# Patient Record
Sex: MALE | Race: WHITE | NOT HISPANIC OR LATINO | URBAN - METROPOLITAN AREA
[De-identification: names, ages, dates, MRNs, and addresses within clinical notes are randomized per-mention and may not be internally consistent; named-entity substitution may affect disease eponyms.]

---

## 2017-02-20 ENCOUNTER — INPATIENT (INPATIENT)
Facility: HOSPITAL | Age: 52
LOS: 2 days | Discharge: HOME | DRG: 603 | End: 2017-02-23
Attending: INTERNAL MEDICINE | Admitting: INTERNAL MEDICINE
Payer: COMMERCIAL

## 2017-02-20 VITALS
OXYGEN SATURATION: 100 % | WEIGHT: 235.01 LBS | SYSTOLIC BLOOD PRESSURE: 153 MMHG | TEMPERATURE: 98 F | HEART RATE: 84 BPM | RESPIRATION RATE: 18 BRPM | DIASTOLIC BLOOD PRESSURE: 95 MMHG | HEIGHT: 70 IN

## 2017-02-20 DIAGNOSIS — Z21 ASYMPTOMATIC HUMAN IMMUNODEFICIENCY VIRUS [HIV] INFECTION STATUS: ICD-10-CM

## 2017-02-20 DIAGNOSIS — Z41.8 ENCOUNTER FOR OTHER PROCEDURES FOR PURPOSES OTHER THAN REMEDYING HEALTH STATE: ICD-10-CM

## 2017-02-20 DIAGNOSIS — L03.213 PERIORBITAL CELLULITIS: ICD-10-CM

## 2017-02-20 DIAGNOSIS — I10 ESSENTIAL (PRIMARY) HYPERTENSION: ICD-10-CM

## 2017-02-20 DIAGNOSIS — J45.909 UNSPECIFIED ASTHMA, UNCOMPLICATED: ICD-10-CM

## 2017-02-20 LAB
ALBUMIN SERPL ELPH-MCNC: 3.9 G/DL — SIGNIFICANT CHANGE UP (ref 3.5–5)
ALP SERPL-CCNC: 86 U/L — SIGNIFICANT CHANGE UP (ref 40–120)
ALT FLD-CCNC: 54 U/L DA — SIGNIFICANT CHANGE UP (ref 10–60)
ANION GAP SERPL CALC-SCNC: 9 MMOL/L — SIGNIFICANT CHANGE UP (ref 5–17)
AST SERPL-CCNC: 16 U/L — SIGNIFICANT CHANGE UP (ref 10–40)
BASOPHILS # BLD AUTO: 0.1 K/UL — SIGNIFICANT CHANGE UP (ref 0–0.2)
BASOPHILS NFR BLD AUTO: 0.8 % — SIGNIFICANT CHANGE UP (ref 0–2)
BILIRUB SERPL-MCNC: 0.4 MG/DL — SIGNIFICANT CHANGE UP (ref 0.2–1.2)
BUN SERPL-MCNC: 14 MG/DL — SIGNIFICANT CHANGE UP (ref 7–18)
CALCIUM SERPL-MCNC: 8.9 MG/DL — SIGNIFICANT CHANGE UP (ref 8.4–10.5)
CHLORIDE SERPL-SCNC: 107 MMOL/L — SIGNIFICANT CHANGE UP (ref 96–108)
CO2 SERPL-SCNC: 25 MMOL/L — SIGNIFICANT CHANGE UP (ref 22–31)
CREAT SERPL-MCNC: 0.73 MG/DL — SIGNIFICANT CHANGE UP (ref 0.5–1.3)
EOSINOPHIL # BLD AUTO: 0.2 K/UL — SIGNIFICANT CHANGE UP (ref 0–0.5)
EOSINOPHIL NFR BLD AUTO: 2.7 % — SIGNIFICANT CHANGE UP (ref 0–6)
GLUCOSE SERPL-MCNC: 90 MG/DL — SIGNIFICANT CHANGE UP (ref 70–99)
HCT VFR BLD CALC: 45.6 % — SIGNIFICANT CHANGE UP (ref 39–50)
HGB BLD-MCNC: 15.4 G/DL — SIGNIFICANT CHANGE UP (ref 13–17)
LACTATE SERPL-SCNC: 1.4 MMOL/L — SIGNIFICANT CHANGE UP (ref 0.7–2)
LYMPHOCYTES # BLD AUTO: 1.7 K/UL — SIGNIFICANT CHANGE UP (ref 1–3.3)
LYMPHOCYTES # BLD AUTO: 23 % — SIGNIFICANT CHANGE UP (ref 13–44)
MCHC RBC-ENTMCNC: 31.2 PG — SIGNIFICANT CHANGE UP (ref 27–34)
MCHC RBC-ENTMCNC: 33.9 GM/DL — SIGNIFICANT CHANGE UP (ref 32–36)
MCV RBC AUTO: 91.9 FL — SIGNIFICANT CHANGE UP (ref 80–100)
MONOCYTES # BLD AUTO: 0.7 K/UL — SIGNIFICANT CHANGE UP (ref 0–0.9)
MONOCYTES NFR BLD AUTO: 9 % — SIGNIFICANT CHANGE UP (ref 2–14)
NEUTROPHILS # BLD AUTO: 4.8 K/UL — SIGNIFICANT CHANGE UP (ref 1.8–7.4)
NEUTROPHILS NFR BLD AUTO: 64.6 % — SIGNIFICANT CHANGE UP (ref 43–77)
PLATELET # BLD AUTO: 238 K/UL — SIGNIFICANT CHANGE UP (ref 150–400)
POTASSIUM SERPL-MCNC: 4 MMOL/L — SIGNIFICANT CHANGE UP (ref 3.5–5.3)
POTASSIUM SERPL-SCNC: 4 MMOL/L — SIGNIFICANT CHANGE UP (ref 3.5–5.3)
PROT SERPL-MCNC: 7.8 G/DL — SIGNIFICANT CHANGE UP (ref 6–8.3)
RBC # BLD: 4.96 M/UL — SIGNIFICANT CHANGE UP (ref 4.2–5.8)
RBC # FLD: 11.9 % — SIGNIFICANT CHANGE UP (ref 10.3–14.5)
SODIUM SERPL-SCNC: 141 MMOL/L — SIGNIFICANT CHANGE UP (ref 135–145)
WBC # BLD: 7.5 K/UL — SIGNIFICANT CHANGE UP (ref 3.8–10.5)
WBC # FLD AUTO: 7.5 K/UL — SIGNIFICANT CHANGE UP (ref 3.8–10.5)

## 2017-02-20 PROCEDURE — 99285 EMERGENCY DEPT VISIT HI MDM: CPT

## 2017-02-20 PROCEDURE — 70481 CT ORBIT/EAR/FOSSA W/DYE: CPT | Mod: 26

## 2017-02-20 RX ORDER — EFAVIRENZ, EMTRICITABINE AND TENOFOVIR DISOPROXIL FUMARATE 600; 200; 300 MG/1; MG/1; MG/1
1 TABLET, FILM COATED ORAL DAILY
Qty: 0 | Refills: 0 | Status: DISCONTINUED | OUTPATIENT
Start: 2017-02-20 | End: 2017-02-23

## 2017-02-20 RX ORDER — ENOXAPARIN SODIUM 100 MG/ML
40 INJECTION SUBCUTANEOUS DAILY
Qty: 0 | Refills: 0 | Status: DISCONTINUED | OUTPATIENT
Start: 2017-02-20 | End: 2017-02-23

## 2017-02-20 RX ORDER — NEOMYCIN SULFATE, POLYMYXIN B SULFATE AND HYDROCORTISONE 3.5; 10000; 1 MG/ML; [USP'U]/ML; MG/ML
2 SUSPENSION OPHTHALMIC THREE TIMES A DAY
Qty: 0 | Refills: 0 | Status: DISCONTINUED | OUTPATIENT
Start: 2017-02-20 | End: 2017-02-20

## 2017-02-20 RX ORDER — VANCOMYCIN HCL 1 G
1000 VIAL (EA) INTRAVENOUS ONCE
Qty: 0 | Refills: 0 | Status: COMPLETED | OUTPATIENT
Start: 2017-02-20 | End: 2017-02-20

## 2017-02-20 RX ORDER — PIPERACILLIN AND TAZOBACTAM 4; .5 G/20ML; G/20ML
3.38 INJECTION, POWDER, LYOPHILIZED, FOR SOLUTION INTRAVENOUS EVERY 8 HOURS
Qty: 0 | Refills: 0 | Status: DISCONTINUED | OUTPATIENT
Start: 2017-02-20 | End: 2017-02-21

## 2017-02-20 RX ORDER — NEOMYCIN/POLYMYXIN B/DEXAMETHA 0.1 %
2 SUSPENSION, DROPS(FINAL DOSAGE FORM)(ML) OPHTHALMIC (EYE) THREE TIMES A DAY
Qty: 0 | Refills: 0 | Status: COMPLETED | OUTPATIENT
Start: 2017-02-20 | End: 2017-02-23

## 2017-02-20 RX ORDER — VANCOMYCIN HCL 1 G
1000 VIAL (EA) INTRAVENOUS EVERY 12 HOURS
Qty: 0 | Refills: 0 | Status: DISCONTINUED | OUTPATIENT
Start: 2017-02-20 | End: 2017-02-23

## 2017-02-20 RX ORDER — LISINOPRIL 2.5 MG/1
40 TABLET ORAL DAILY
Qty: 0 | Refills: 0 | Status: DISCONTINUED | OUTPATIENT
Start: 2017-02-20 | End: 2017-02-23

## 2017-02-20 RX ORDER — PIPERACILLIN AND TAZOBACTAM 4; .5 G/20ML; G/20ML
3.38 INJECTION, POWDER, LYOPHILIZED, FOR SOLUTION INTRAVENOUS ONCE
Qty: 0 | Refills: 0 | Status: COMPLETED | OUTPATIENT
Start: 2017-02-20 | End: 2017-02-20

## 2017-02-20 RX ADMIN — Medication 250 MILLIGRAM(S): at 14:06

## 2017-02-20 RX ADMIN — PIPERACILLIN AND TAZOBACTAM 25 GRAM(S): 4; .5 INJECTION, POWDER, LYOPHILIZED, FOR SOLUTION INTRAVENOUS at 21:09

## 2017-02-20 RX ADMIN — EFAVIRENZ, EMTRICITABINE AND TENOFOVIR DISOPROXIL FUMARATE 1 TABLET(S): 600; 200; 300 TABLET, FILM COATED ORAL at 20:05

## 2017-02-20 RX ADMIN — Medication 2 DROP(S): at 22:41

## 2017-02-20 RX ADMIN — PIPERACILLIN AND TAZOBACTAM 200 GRAM(S): 4; .5 INJECTION, POWDER, LYOPHILIZED, FOR SOLUTION INTRAVENOUS at 14:05

## 2017-02-20 NOTE — H&P ADULT. - NEGATIVE GENERAL SYMPTOMS
no fatigue/no fever/no malaise/no weight gain/no polyuria/no anorexia/no polyphagia/no chills/no sweating/no polydipsia/no weight loss

## 2017-02-20 NOTE — H&P ADULT. - PROBLEM SELECTOR PLAN 2
HIV( 13 yrs), on atripla ( last CD4 count about 8 months ago, normal, viral load < 20 copies, )   Continue with same meds, f/up on Viral load, T cell subset in AM

## 2017-02-20 NOTE — ED ADULT NURSE NOTE - OBJECTIVE STATEMENT
Presented to the ED complaining of left eye swelling, redness and pain that started last friday per patient. Eye got swollen today. AA&Ox3. Breathing on room air. No discharge observed at this time.

## 2017-02-20 NOTE — H&P ADULT. - NEGATIVE OPHTHALMOLOGIC SYMPTOMS
no discharge R/no photophobia/no lacrimation R/no diplopia/no lacrimation L/no discharge L/no blurred vision L/no blurred vision R

## 2017-02-20 NOTE — ED PROVIDER NOTE - OBJECTIVE STATEMENT
52 y/o M w/ PMHx of HIV and HTN p/w L eye pain and swelling onset yesterday w/ pain on movement. Pt went to urgent care yesterday and was given Azithromycin and BID Augmentin. Pt reports sty to R eye 2 weeks ago which resolved on its own. Pt denies foreign body, cough, abd pain, visual changes, or any other complaint. NKDA. CD4 count last checked 6 months ago, 1300. 50 y/o M w/ PMHx of HIV and HTN p/w L eye pain and swelling onset yesterday w/ pain on movement. Pt went to urgent care yesterday and was given Azithromycin and BID Augmentin. Pt reports sty to R eye 2 weeks ago which resolved on its own. Pt denies injury/trauma, foreign body, cough, abd pain, visual changes, or any other complaint. NKDA. CD4 count last checked 6 months ago, 1300. 52 y/o M w/ PMHx of HIV and HTN p/w L eye pain and swelling onset yesterday w/ pain on movement. Pt went to urgent care yesterday and was given Azithromycin and BID Augmentin. Pt reports sty to R eye 2 weeks ago which resolved on its own. Pain increased w/ EOM.  Pt denies injury/trauma (despite RN note), foreign body, cough, abd pain, visual changes, or any other complaint. NKDA. CD4 count last checked 6 months ago, 1300.

## 2017-02-20 NOTE — H&P ADULT. - ASSESSMENT
52 Y/O M, from home, PMHx of HIV ( 13 yrs ago), Hypertension, asthma, presented with worsening swelling of left eye. Admitted likely for questionable orbital cellulitis, IV antibiotics.     In ED , vital signs stable , /95, afebrile, RR 18, Spo2 98. On physical exam the left periorbital area is edematous, erythematous, chemosis , non-tender, pain on eye movements. On labs , normal, no leukocytosis. EKG NSR at 88 bpm. CT scan of orbit shows  skin thickening and swelling and subcutaneous edema in the  tissues overlying the left orbit. There is no subcutaneous air,  no focal fluid collection. The orbit itself and retrobulbar regions are  unremarkable, no abnormal lymphadenopathy. There is mild thickening of the bilateral maxillary sinuses. The other  visualized sinuses and air spaces are unremarkable. The intracranial contents are unremarkable. The osseous structures are unremarkable.

## 2017-02-20 NOTE — ED PROVIDER NOTE - PROGRESS NOTE DETAILS
The scribe's documentation has been prepared under my direction and personally reviewed by me in its entirety. I confirm that the note above accurately reflects all work, treatment, procedures, and medical decicion-making performed by me.  Po Hicks MD Pt just moved from [place], no PMD/ID in area.  Dr Merritt d/w Dr. Lovell and MAR.

## 2017-02-20 NOTE — H&P ADULT. - MUSCULOSKELETAL
details… detailed exam no joint erythema/no joint warmth/no calf tenderness/normal/ROM intact/normal strength/no joint swelling

## 2017-02-20 NOTE — H&P ADULT. - NEGATIVE GASTROINTESTINAL SYMPTOMS
no vomiting/no steatorrhea/no melena/no constipation/no hematochezia/no nausea/no change in bowel habits/no abdominal pain/no hiccoughs/no flatulence/no diarrhea/no jaundice

## 2017-02-20 NOTE — H&P ADULT. - NEUROLOGICAL DETAILS
no spontaneous movement/disoriented/responds to verbal commands/cranial nerves intact/alert and oriented x 3/responds to pain/deep reflexes intact/sensation intact/superficial reflexes intact

## 2017-02-20 NOTE — ED PROVIDER NOTE - ATTENDING CONTRIBUTION TO CARE
DR. VANESSA, ATTENDING MD-  I performed a face to face bedside interview with patient regarding history of present illness, review of symptoms and past medical history. I completed an independent physical exam.  I have discussed patient's plan of care with the resident.   Documentation as above in the note. DR. VANESSA, ATTENDING MD-  I performed a face to face bedside interview with patient regarding history of present illness, review of symptoms and past medical history. I completed an independent physical exam.  I have discussed patient's plan of care with the resident.   Documentation as above in the note.  HPI: HIV+ M with normal CD4 8 months ago just moved back to [facility] from [place] that presents with left eye swelling x 2 days. Was seen at  for stye on Right and Left eye, Rx'd augmentin and Erythromycin eye drops but woke up today with severe left eye swelling, no vision changes, no pain with eye mvmt, no fevers, chills, nausea, vomiting.   EXAM: Left periorbital swelling, +erythema, warmth. No discharge, no open wounds, no bleeding, induration around left eye. Eye: EOMI, PERRL. No proptosis, right eye normal with stye on upper margin. otherwise well appearing.   MDM: Most likely preorbital cellulitis. Low likelihood posterior cellulitis. Will obtain labs and CT and needs admission as failed Outpt Abx.   Labs WNL, including LA and WBC. CT shows periorbital cellulitis. No posterior or other infectious etiology behind eye. Will provide one dose Vancomycin and admit.

## 2017-02-20 NOTE — H&P ADULT. - GASTROINTESTINAL DETAILS
no rebound tenderness/normal/no masses palpable/no rigidity/bowel sounds normal/no bruit/soft/no guarding/no organomegaly/no distention/nontender

## 2017-02-20 NOTE — H&P ADULT. - PROBLEM SELECTOR PLAN 1
Worsening swelling, pain, redness of left eye,   On physical exam pain on eye movements. Vitals stable   On labs no WBC count  CT scan of orbit shows  skin thickening and swelling and subcutaneous edema in the   tissues overlying the left orbit. There is no subcutaneous air,  no focal fluid collection. The orbit itself and retrobulbar regions are  unremarkable, no abnormal lymphadenopathy. There is mild thickening of the bilateral maxillary sinuses. The other  visualized sinuses and air spaces are unremarkable. The intracranial contents are unremarkable. The osseous structures are unremarkable.  Got vancomycin and zosyn in ED  ID Dr Khan consulted  Continue with same antibiotic, corticosporin ointment, warm compressors .  F/u on Vanco trough

## 2017-02-20 NOTE — H&P ADULT. - PROBLEM SELECTOR PLAN 4
Controlled, patient takes montelukast and bronchodilators. Not in exacerbation at this time, no need to give bronchodilator.

## 2017-02-20 NOTE — H&P ADULT. - HISTORY OF PRESENT ILLNESS
50 Y/O M, from home, PMHx of HIV ( 13 yrs ago), Hypertension, asthma, presented with worsening swelling of left eye. According to patient about 2 weeks ago he had small stye on upper right eyelid, he took OTC medication, ointment, warm compressors. Swelling resolved later on. About 3 days ago he started having severe left eye pain, small swelling alonwith redness on lower eyelid. He went to urgent care, prescribed augmenti and erythromycin ointment. He woke up this morning with sever excruciating pain, inability to open the left eye. He then came to ED. Left eye selling associated with pain on eye movements redness, chemosis, slight yellow colour dishcrage. Deneis any trauma , insect bite to left eye, any fever, chills, recent flu like symptoms, discharge from eye. He has HIV since last 13 yrs , take atripla ( compliant , 5-7 yrs) , being followed by ID. AS per him last CD4 count was about 8 months ago that was normal , viral load  <20 copies, T cell subset around 1687-0167. For hsi HTN he take lisinopril. Also history of asthma, controlled. Patient moved from [place] to [place] about a year ago, does not have infectious disease specialist here. Last visit to PCP was about 2 months ago for routine check up.     FHx: Parents are diseased, have diabetes  Shx: Former smoker of about half pack / day x 30 yrs, stopped in last year april. Social alcohol drinking. No IV drug use. Works in [employer] company. Not . Recent travel to [place] about on last month.   Allergies: NKDA 50 Y/O M, from home, PMHx of HIV ( 13 yrs ago), Hypertension, asthma, presented with worsening swelling of left eye. According to patient about 2 weeks ago he had small stye on upper right eyelid, he took OTC medication, ointment, warm compressors. Swelling resolved later on. About 3 days ago he started having severe left eye pain, small swelling alonwith redness on lower eyelid. He went to urgent care, prescribed augmentin and erythromycin ointment. He woke up this morning with sever excruciating pain, inability to open the left eye. He then came to ED. Left eye selling associated with pain on eye movements redness, chemosis, slight yellow colour dishcrage. Deneis any trauma , insect bite to left eye, any fever, chills, recent flu like symptoms, discharge from eye. He has HIV since last 13 yrs , take atripla ( compliant , 5-7 yrs) , being followed by ID. AS per him last CD4 count was about 8 months ago that was normal , viral load  <20 copies, T cell subset around 2197-6241. For hsi HTN he take lisinopril. Also history of asthma, controlled. Patient moved from [place] to [place] about a year ago, does not have infectious disease specialist here. Last visit to PCP was about 2 months ago for routine check up.     FHx: Parents are diseased, have diabetes  Shx: Former smoker of about half pack / day x 30 yrs, stopped in last year april. Social alcohol drinking. No IV drug use. Works in [employer] company. Not . Recent travel to [place] about on last month.   Allergies: NKDA

## 2017-02-20 NOTE — H&P ADULT. - PROBLEM SELECTOR PLAN 5
Improved VTE score is 0, with 3 month risk of VTE is < 0.4. Continue with lovenox for now . No need for GI prophylaxis.

## 2017-02-20 NOTE — H&P ADULT. - RS GEN PE MLT RESP DETAILS PC
respirations non-labored/clear to auscultation bilaterally/no rales/normal/no wheezes/breath sounds equal/no subcutaneous emphysema/no chest wall tenderness/no intercostal retractions/no rhonchi/airway patent/good air movement

## 2017-02-20 NOTE — H&P ADULT. - NEGATIVE CARDIOVASCULAR SYMPTOMS
no chest pain/no palpitations/no dyspnea on exertion/no claudication/no peripheral edema/no paroxysmal nocturnal dyspnea/no orthopnea

## 2017-02-20 NOTE — ED PROVIDER NOTE - NS ED MD SCRIBE ATTENDING SCRIBE SECTIONS
HISTORY OF PRESENT ILLNESS/PAST MEDICAL/SURGICAL/SOCIAL HISTORY/PHYSICAL EXAM/VITAL SIGNS( Pullset)/HIV/REVIEW OF SYSTEMS/DISPOSITION

## 2017-02-20 NOTE — H&P ADULT. - NEGATIVE ENMT SYMPTOMS
no throat pain/no nasal obstruction/no gum bleeding/no dry mouth/no dysphagia/no tinnitus/no abnormal taste sensation/no vertigo/no post-nasal discharge/no sinus symptoms/no nasal discharge/no recurrent cold sores/no hearing difficulty/no ear pain/no nasal congestion/no nose bleeds

## 2017-02-20 NOTE — H&P ADULT. - NEGATIVE MUSCULOSKELETAL SYMPTOMS
no arm pain R/no muscle cramps/no muscle weakness/no back pain/no leg pain L/no joint swelling/no neck pain/no leg pain R/no arthralgia/no myalgia/no arthritis/no stiffness/no arm pain L

## 2017-02-21 LAB
ALBUMIN SERPL ELPH-MCNC: 3.5 G/DL — SIGNIFICANT CHANGE UP (ref 3.5–5)
ALP SERPL-CCNC: 77 U/L — SIGNIFICANT CHANGE UP (ref 40–120)
ALT FLD-CCNC: 49 U/L DA — SIGNIFICANT CHANGE UP (ref 10–60)
ANION GAP SERPL CALC-SCNC: 9 MMOL/L — SIGNIFICANT CHANGE UP (ref 5–17)
AST SERPL-CCNC: 14 U/L — SIGNIFICANT CHANGE UP (ref 10–40)
BASOPHILS # BLD AUTO: 0 K/UL — SIGNIFICANT CHANGE UP (ref 0–0.2)
BASOPHILS NFR BLD AUTO: 0.7 % — SIGNIFICANT CHANGE UP (ref 0–2)
BILIRUB SERPL-MCNC: 0.4 MG/DL — SIGNIFICANT CHANGE UP (ref 0.2–1.2)
BUN SERPL-MCNC: 15 MG/DL — SIGNIFICANT CHANGE UP (ref 7–18)
CALCIUM SERPL-MCNC: 8.6 MG/DL — SIGNIFICANT CHANGE UP (ref 8.4–10.5)
CHLORIDE SERPL-SCNC: 106 MMOL/L — SIGNIFICANT CHANGE UP (ref 96–108)
CHOLEST SERPL-MCNC: 160 MG/DL — SIGNIFICANT CHANGE UP (ref 10–199)
CO2 SERPL-SCNC: 27 MMOL/L — SIGNIFICANT CHANGE UP (ref 22–31)
CREAT SERPL-MCNC: 0.89 MG/DL — SIGNIFICANT CHANGE UP (ref 0.5–1.3)
EOSINOPHIL # BLD AUTO: 0.2 K/UL — SIGNIFICANT CHANGE UP (ref 0–0.5)
EOSINOPHIL NFR BLD AUTO: 4.8 % — SIGNIFICANT CHANGE UP (ref 0–6)
FOLATE SERPL-MCNC: 15.3 NG/ML — SIGNIFICANT CHANGE UP (ref 4.8–24.2)
GLUCOSE SERPL-MCNC: 140 MG/DL — HIGH (ref 70–99)
GRAM STN FLD: SIGNIFICANT CHANGE UP
HBA1C BLD-MCNC: 5.5 % — SIGNIFICANT CHANGE UP (ref 4–5.6)
HCT VFR BLD CALC: 43.9 % — SIGNIFICANT CHANGE UP (ref 39–50)
HDLC SERPL-MCNC: 29 MG/DL — LOW (ref 40–125)
HGB BLD-MCNC: 14.8 G/DL — SIGNIFICANT CHANGE UP (ref 13–17)
LIPID PNL WITH DIRECT LDL SERPL: 106 MG/DL — SIGNIFICANT CHANGE UP
LYMPHOCYTES # BLD AUTO: 1.2 K/UL — SIGNIFICANT CHANGE UP (ref 1–3.3)
LYMPHOCYTES # BLD AUTO: 22.5 % — SIGNIFICANT CHANGE UP (ref 13–44)
MAGNESIUM SERPL-MCNC: 2.3 MG/DL — SIGNIFICANT CHANGE UP (ref 1.8–2.4)
MCHC RBC-ENTMCNC: 31.4 PG — SIGNIFICANT CHANGE UP (ref 27–34)
MCHC RBC-ENTMCNC: 33.7 GM/DL — SIGNIFICANT CHANGE UP (ref 32–36)
MCV RBC AUTO: 93.3 FL — SIGNIFICANT CHANGE UP (ref 80–100)
MONOCYTES # BLD AUTO: 0.5 K/UL — SIGNIFICANT CHANGE UP (ref 0–0.9)
MONOCYTES NFR BLD AUTO: 8.9 % — SIGNIFICANT CHANGE UP (ref 2–14)
NEUTROPHILS # BLD AUTO: 3.3 K/UL — SIGNIFICANT CHANGE UP (ref 1.8–7.4)
NEUTROPHILS NFR BLD AUTO: 63 % — SIGNIFICANT CHANGE UP (ref 43–77)
PHOSPHATE SERPL-MCNC: 3.4 MG/DL — SIGNIFICANT CHANGE UP (ref 2.5–4.5)
PLATELET # BLD AUTO: 227 K/UL — SIGNIFICANT CHANGE UP (ref 150–400)
POTASSIUM SERPL-MCNC: 4.4 MMOL/L — SIGNIFICANT CHANGE UP (ref 3.5–5.3)
POTASSIUM SERPL-SCNC: 4.4 MMOL/L — SIGNIFICANT CHANGE UP (ref 3.5–5.3)
PROT SERPL-MCNC: 6.9 G/DL — SIGNIFICANT CHANGE UP (ref 6–8.3)
RBC # BLD: 4.71 M/UL — SIGNIFICANT CHANGE UP (ref 4.2–5.8)
RBC # FLD: 12 % — SIGNIFICANT CHANGE UP (ref 10.3–14.5)
SODIUM SERPL-SCNC: 142 MMOL/L — SIGNIFICANT CHANGE UP (ref 135–145)
TOTAL CHOLESTEROL/HDL RATIO MEASUREMENT: 5.5 RATIO — SIGNIFICANT CHANGE UP (ref 3.4–9.6)
TRIGL SERPL-MCNC: 125 MG/DL — SIGNIFICANT CHANGE UP (ref 10–149)
VIT B12 SERPL-MCNC: 839 PG/ML — SIGNIFICANT CHANGE UP (ref 243–894)
WBC # BLD: 5.2 K/UL — SIGNIFICANT CHANGE UP (ref 3.8–10.5)
WBC # FLD AUTO: 5.2 K/UL — SIGNIFICANT CHANGE UP (ref 3.8–10.5)

## 2017-02-21 PROCEDURE — 71020: CPT | Mod: 26

## 2017-02-21 RX ADMIN — Medication 250 MILLIGRAM(S): at 05:12

## 2017-02-21 RX ADMIN — PIPERACILLIN AND TAZOBACTAM 25 GRAM(S): 4; .5 INJECTION, POWDER, LYOPHILIZED, FOR SOLUTION INTRAVENOUS at 14:19

## 2017-02-21 RX ADMIN — LISINOPRIL 40 MILLIGRAM(S): 2.5 TABLET ORAL at 06:17

## 2017-02-21 RX ADMIN — PIPERACILLIN AND TAZOBACTAM 25 GRAM(S): 4; .5 INJECTION, POWDER, LYOPHILIZED, FOR SOLUTION INTRAVENOUS at 06:17

## 2017-02-21 RX ADMIN — ENOXAPARIN SODIUM 40 MILLIGRAM(S): 100 INJECTION SUBCUTANEOUS at 12:46

## 2017-02-21 RX ADMIN — Medication 2 DROP(S): at 06:17

## 2017-02-21 RX ADMIN — Medication 250 MILLIGRAM(S): at 17:01

## 2017-02-21 RX ADMIN — Medication 2 DROP(S): at 21:15

## 2017-02-21 RX ADMIN — Medication 2 DROP(S): at 14:20

## 2017-02-21 RX ADMIN — EFAVIRENZ, EMTRICITABINE AND TENOFOVIR DISOPROXIL FUMARATE 1 TABLET(S): 600; 200; 300 TABLET, FILM COATED ORAL at 21:15

## 2017-02-22 LAB
4/8 RATIO: 2.37 RATIO — SIGNIFICANT CHANGE UP (ref 0.9–3.6)
ABS CD8: 268 /UL — SIGNIFICANT CHANGE UP (ref 136–757)
ANION GAP SERPL CALC-SCNC: 7 MMOL/L — SIGNIFICANT CHANGE UP (ref 5–17)
BUN SERPL-MCNC: 15 MG/DL — SIGNIFICANT CHANGE UP (ref 7–18)
CALCIUM SERPL-MCNC: 8.3 MG/DL — LOW (ref 8.4–10.5)
CD3 BLASTS SPEC-ACNC: 82 % — SIGNIFICANT CHANGE UP (ref 59–85)
CD3 BLASTS SPEC-ACNC: 899 /UL — SIGNIFICANT CHANGE UP (ref 799–2171)
CD4 %: 58 % — SIGNIFICANT CHANGE UP (ref 36–65)
CD8 %: 24 % — SIGNIFICANT CHANGE UP (ref 11–36)
CHLORIDE SERPL-SCNC: 106 MMOL/L — SIGNIFICANT CHANGE UP (ref 96–108)
CO2 SERPL-SCNC: 26 MMOL/L — SIGNIFICANT CHANGE UP (ref 22–31)
CREAT SERPL-MCNC: 0.79 MG/DL — SIGNIFICANT CHANGE UP (ref 0.5–1.3)
GLUCOSE SERPL-MCNC: 131 MG/DL — HIGH (ref 70–99)
GRAM STN FLD: SIGNIFICANT CHANGE UP
HCT VFR BLD CALC: 43.5 % — SIGNIFICANT CHANGE UP (ref 39–50)
HGB BLD-MCNC: 14.6 G/DL — SIGNIFICANT CHANGE UP (ref 13–17)
HIV1 RNA # SERPL NAA+PROBE: SIGNIFICANT CHANGE UP
HIV1 RNA SERPL NAA+PROBE-LOG#: ABNORMAL LG10COP/ML
MAGNESIUM SERPL-MCNC: 2.2 MG/DL — SIGNIFICANT CHANGE UP (ref 1.8–2.4)
MCHC RBC-ENTMCNC: 31.1 PG — SIGNIFICANT CHANGE UP (ref 27–34)
MCHC RBC-ENTMCNC: 33.6 GM/DL — SIGNIFICANT CHANGE UP (ref 32–36)
MCV RBC AUTO: 92.5 FL — SIGNIFICANT CHANGE UP (ref 80–100)
PHOSPHATE SERPL-MCNC: 3.1 MG/DL — SIGNIFICANT CHANGE UP (ref 2.5–4.5)
PLATELET # BLD AUTO: 203 K/UL — SIGNIFICANT CHANGE UP (ref 150–400)
POTASSIUM SERPL-MCNC: 4.1 MMOL/L — SIGNIFICANT CHANGE UP (ref 3.5–5.3)
POTASSIUM SERPL-SCNC: 4.1 MMOL/L — SIGNIFICANT CHANGE UP (ref 3.5–5.3)
RBC # BLD: 4.7 M/UL — SIGNIFICANT CHANGE UP (ref 4.2–5.8)
RBC # FLD: 11.7 % — SIGNIFICANT CHANGE UP (ref 10.3–14.5)
SODIUM SERPL-SCNC: 139 MMOL/L — SIGNIFICANT CHANGE UP (ref 135–145)
T-CELL CD4 SUBSET PNL BLD: 635 /UL — SIGNIFICANT CHANGE UP (ref 489–1457)
VANCOMYCIN TROUGH SERPL-MCNC: 26.7 UG/ML — CRITICAL HIGH (ref 10–20)
VANCOMYCIN TROUGH SERPL-MCNC: 5.8 UG/ML — LOW (ref 10–20)
WBC # BLD: 5.4 K/UL — SIGNIFICANT CHANGE UP (ref 3.8–10.5)
WBC # FLD AUTO: 5.4 K/UL — SIGNIFICANT CHANGE UP (ref 3.8–10.5)

## 2017-02-22 RX ADMIN — Medication 2 DROP(S): at 14:08

## 2017-02-22 RX ADMIN — Medication 250 MILLIGRAM(S): at 05:25

## 2017-02-22 RX ADMIN — ENOXAPARIN SODIUM 40 MILLIGRAM(S): 100 INJECTION SUBCUTANEOUS at 12:52

## 2017-02-22 RX ADMIN — LISINOPRIL 40 MILLIGRAM(S): 2.5 TABLET ORAL at 05:25

## 2017-02-22 RX ADMIN — Medication 2 DROP(S): at 22:44

## 2017-02-22 RX ADMIN — EFAVIRENZ, EMTRICITABINE AND TENOFOVIR DISOPROXIL FUMARATE 1 TABLET(S): 600; 200; 300 TABLET, FILM COATED ORAL at 14:08

## 2017-02-22 RX ADMIN — Medication 2 DROP(S): at 05:25

## 2017-02-22 RX ADMIN — Medication 250 MILLIGRAM(S): at 18:59

## 2017-02-22 NOTE — PROVIDER CONTACT NOTE (CRITICAL VALUE NOTIFICATION) - TEST AND RESULT REPORTED:
Blood Cultures collected 2/20/2017 result are preliminary growth in aerobic and anaerobic bottles gram positive cocci in clusters

## 2017-02-23 VITALS
TEMPERATURE: 98 F | OXYGEN SATURATION: 98 % | SYSTOLIC BLOOD PRESSURE: 148 MMHG | DIASTOLIC BLOOD PRESSURE: 86 MMHG | RESPIRATION RATE: 18 BRPM | HEART RATE: 78 BPM

## 2017-02-23 LAB
ANION GAP SERPL CALC-SCNC: 10 MMOL/L — SIGNIFICANT CHANGE UP (ref 5–17)
BUN SERPL-MCNC: 12 MG/DL — SIGNIFICANT CHANGE UP (ref 7–18)
CALCIUM SERPL-MCNC: 8.5 MG/DL — SIGNIFICANT CHANGE UP (ref 8.4–10.5)
CHLORIDE SERPL-SCNC: 106 MMOL/L — SIGNIFICANT CHANGE UP (ref 96–108)
CO2 SERPL-SCNC: 25 MMOL/L — SIGNIFICANT CHANGE UP (ref 22–31)
CREAT SERPL-MCNC: 0.76 MG/DL — SIGNIFICANT CHANGE UP (ref 0.5–1.3)
CULTURE RESULTS: SIGNIFICANT CHANGE UP
GLUCOSE SERPL-MCNC: 103 MG/DL — HIGH (ref 70–99)
HCT VFR BLD CALC: 42.9 % — SIGNIFICANT CHANGE UP (ref 39–50)
HGB BLD-MCNC: 14.4 G/DL — SIGNIFICANT CHANGE UP (ref 13–17)
MAGNESIUM SERPL-MCNC: 2.2 MG/DL — SIGNIFICANT CHANGE UP (ref 1.8–2.4)
MCHC RBC-ENTMCNC: 31 PG — SIGNIFICANT CHANGE UP (ref 27–34)
MCHC RBC-ENTMCNC: 33.7 GM/DL — SIGNIFICANT CHANGE UP (ref 32–36)
MCV RBC AUTO: 92 FL — SIGNIFICANT CHANGE UP (ref 80–100)
PHOSPHATE SERPL-MCNC: 3.3 MG/DL — SIGNIFICANT CHANGE UP (ref 2.5–4.5)
PLATELET # BLD AUTO: 197 K/UL — SIGNIFICANT CHANGE UP (ref 150–400)
POTASSIUM SERPL-MCNC: 4.3 MMOL/L — SIGNIFICANT CHANGE UP (ref 3.5–5.3)
POTASSIUM SERPL-SCNC: 4.3 MMOL/L — SIGNIFICANT CHANGE UP (ref 3.5–5.3)
RBC # BLD: 4.66 M/UL — SIGNIFICANT CHANGE UP (ref 4.2–5.8)
RBC # FLD: 11.6 % — SIGNIFICANT CHANGE UP (ref 10.3–14.5)
SODIUM SERPL-SCNC: 141 MMOL/L — SIGNIFICANT CHANGE UP (ref 135–145)
SPECIMEN SOURCE: SIGNIFICANT CHANGE UP
WBC # BLD: 6.5 K/UL — SIGNIFICANT CHANGE UP (ref 3.8–10.5)
WBC # FLD AUTO: 6.5 K/UL — SIGNIFICANT CHANGE UP (ref 3.8–10.5)

## 2017-02-23 PROCEDURE — 87040 BLOOD CULTURE FOR BACTERIA: CPT

## 2017-02-23 PROCEDURE — 84100 ASSAY OF PHOSPHORUS: CPT

## 2017-02-23 PROCEDURE — 80053 COMPREHEN METABOLIC PANEL: CPT

## 2017-02-23 PROCEDURE — 96375 TX/PRO/DX INJ NEW DRUG ADDON: CPT

## 2017-02-23 PROCEDURE — 96374 THER/PROPH/DIAG INJ IV PUSH: CPT | Mod: 59

## 2017-02-23 PROCEDURE — 82607 VITAMIN B-12: CPT

## 2017-02-23 PROCEDURE — 71046 X-RAY EXAM CHEST 2 VIEWS: CPT

## 2017-02-23 PROCEDURE — 83605 ASSAY OF LACTIC ACID: CPT

## 2017-02-23 PROCEDURE — 83735 ASSAY OF MAGNESIUM: CPT

## 2017-02-23 PROCEDURE — 80202 ASSAY OF VANCOMYCIN: CPT

## 2017-02-23 PROCEDURE — 85027 COMPLETE CBC AUTOMATED: CPT

## 2017-02-23 PROCEDURE — 86360 T CELL ABSOLUTE COUNT/RATIO: CPT

## 2017-02-23 PROCEDURE — 80061 LIPID PANEL: CPT

## 2017-02-23 PROCEDURE — 70481 CT ORBIT/EAR/FOSSA W/DYE: CPT

## 2017-02-23 PROCEDURE — 99285 EMERGENCY DEPT VISIT HI MDM: CPT | Mod: 25

## 2017-02-23 PROCEDURE — 93005 ELECTROCARDIOGRAM TRACING: CPT

## 2017-02-23 PROCEDURE — 80048 BASIC METABOLIC PNL TOTAL CA: CPT

## 2017-02-23 PROCEDURE — 87536 HIV-1 QUANT&REVRSE TRNSCRPJ: CPT

## 2017-02-23 PROCEDURE — 82746 ASSAY OF FOLIC ACID SERUM: CPT

## 2017-02-23 PROCEDURE — 83036 HEMOGLOBIN GLYCOSYLATED A1C: CPT

## 2017-02-23 RX ORDER — NEOMYCIN/POLYMYXIN B/DEXAMETHA 0.1 %
1 SUSPENSION, DROPS(FINAL DOSAGE FORM)(ML) OPHTHALMIC (EYE) THREE TIMES A DAY
Qty: 0 | Refills: 0 | Status: DISCONTINUED | OUTPATIENT
Start: 2017-02-23 | End: 2017-02-23

## 2017-02-23 RX ORDER — NEOMYCIN/POLYMYXIN B/DEXAMETHA 0.1 %
1 SUSPENSION, DROPS(FINAL DOSAGE FORM)(ML) OPHTHALMIC (EYE)
Qty: 1 | Refills: 0 | OUTPATIENT
Start: 2017-02-23 | End: 2017-02-25

## 2017-02-23 RX ORDER — VANCOMYCIN HCL 1 G
1250 VIAL (EA) INTRAVENOUS EVERY 12 HOURS
Qty: 0 | Refills: 0 | Status: DISCONTINUED | OUTPATIENT
Start: 2017-02-23 | End: 2017-02-23

## 2017-02-23 RX ORDER — EFAVIRENZ, EMTRICITABINE AND TENOFOVIR DISOPROXIL FUMARATE 600; 200; 300 MG/1; MG/1; MG/1
1 TABLET, FILM COATED ORAL
Qty: 0 | Refills: 0 | COMMUNITY
Start: 2017-02-23

## 2017-02-23 RX ORDER — LISINOPRIL 2.5 MG/1
1 TABLET ORAL
Qty: 30 | Refills: 0 | OUTPATIENT
Start: 2017-02-23 | End: 2017-03-25

## 2017-02-23 RX ADMIN — LISINOPRIL 40 MILLIGRAM(S): 2.5 TABLET ORAL at 06:15

## 2017-02-23 RX ADMIN — ENOXAPARIN SODIUM 40 MILLIGRAM(S): 100 INJECTION SUBCUTANEOUS at 13:08

## 2017-02-23 RX ADMIN — Medication 2 DROP(S): at 06:16

## 2017-02-23 RX ADMIN — Medication 250 MILLIGRAM(S): at 06:16

## 2017-02-23 RX ADMIN — Medication 2 DROP(S): at 13:08

## 2017-02-23 RX ADMIN — EFAVIRENZ, EMTRICITABINE AND TENOFOVIR DISOPROXIL FUMARATE 1 TABLET(S): 600; 200; 300 TABLET, FILM COATED ORAL at 13:08

## 2017-02-23 NOTE — DISCHARGE NOTE ADULT - MEDICATION SUMMARY - MEDICATIONS TO TAKE
I will START or STAY ON the medications listed below when I get home from the hospital:    lisinopril 40 mg oral tablet  -- 1 tab(s) by mouth once a day  -- Indication: For HTN (hypertension)    doxycycline hyclate 100 mg oral tablet  -- 1 tab(s) by mouth 2 times a day  -- Avoid prolonged or excessive exposure to direct and/or artificial sunlight while taking this medication.  Do not take this drug if you are pregnant.  Finish all this medication unless otherwise directed by prescriber.  Medication should be taken with plenty of water.    -- Indication: For Periorbital cellulitis    efavirenz/emtricitabine/tenofovir 600 mg-200 mg-300 mg oral tablet  -- 1 tab(s) by mouth once a day  -- Indication: For HIV (human immunodeficiency virus infection)    dexamethasone/neomycin/polymyxin B 1 mg-3.5 mg-10,000 units/mL ophthalmic suspension  -- 1 drop(s) to each affected eye 3 times a day  -- Indication: For Conjunctivitis

## 2017-02-23 NOTE — DISCHARGE NOTE ADULT - HOSPITAL COURSE
51 year old male from, PMHx of HIV ( diagnosed 13 yrs ago), Hypertension, and asthma, presented with worsening swelling of left eye. The patient was admitted for periorbital cellulitis of the left eye as visualized on orbital CT. He was treated with IV antibiotic vancomycin and is to complete antibiotic course with __ more days of ___. He was also treated with maxitrol for bilateral conjunctivitis. Initial blood culture came back positive for coag negative staph, likely contaminant. Repeat blood cultures were negative. He is to continue atripla and follow up with outpatient ID for HIV management. He is to follow up with PMD in 3-5 days. He is now medically stable and ready for discharge. 51 year old male from, PMHx of HIV ( diagnosed 13 yrs ago), Hypertension, and asthma, presented with worsening swelling of left eye. The patient was admitted for periorbital cellulitis of the left eye as visualized on orbital CT. He was treated with IV antibiotic vancomycin and is to complete antibiotic course with 7 days of doxycycline. He was also treated with maxitrol for bilateral conjunctivitis. Initial blood culture came back positive for coag negative staph, likely contaminant. Repeat blood cultures were negative. He is to continue atripla and follow up with outpatient ID for HIV management. He is to follow up with PMD in 3-5 days. He is now medically stable and ready for discharge.

## 2017-02-23 NOTE — DISCHARGE NOTE ADULT - PATIENT PORTAL LINK FT
“You can access the [facility] Patient Portal, offered by [facility], by registering with the following website: [email or web address]”

## 2017-02-23 NOTE — DISCHARGE NOTE ADULT - PLAN OF CARE
resolution of periorbital cellulitis Patient to continue on oral antibiotics to complete antibiotic course. He is to follow up with PMD in 3-5 days. Patient to continue on atripla and follow up with outpatient ID. continue home medications Patient to continue on doxycycline 100mg orally twice a day for 7 days to complete antibiotic course. He is to follow up with PMD in 3-5 days. Patient to continue on eye drops x 2 more days for bilateral conjunctivitis.

## 2017-02-23 NOTE — DISCHARGE NOTE ADULT - CARE PLAN
Principal Discharge DX:	Periorbital cellulitis of left eye  Goal:	resolution of periorbital cellulitis  Instructions for follow-up, activity and diet:	Patient to continue on oral antibiotics to complete antibiotic course. He is to follow up with PMD in 3-5 days.  Secondary Diagnosis:	HIV (human immunodeficiency virus infection)  Instructions for follow-up, activity and diet:	Patient to continue on atripla and follow up with outpatient ID.  Secondary Diagnosis:	Essential hypertension  Instructions for follow-up, activity and diet:	continue home medications  Secondary Diagnosis:	Uncomplicated asthma, unspecified asthma severity  Instructions for follow-up, activity and diet:	continue home medications Principal Discharge DX:	Periorbital cellulitis of left eye  Goal:	resolution of periorbital cellulitis  Instructions for follow-up, activity and diet:	Patient to continue on doxycycline 100mg orally twice a day for 7 days to complete antibiotic course. He is to follow up with PMD in 3-5 days. Patient to continue on eye drops x 2 more days for bilateral conjunctivitis.  Secondary Diagnosis:	HIV (human immunodeficiency virus infection)  Instructions for follow-up, activity and diet:	Patient to continue on atripla and follow up with outpatient ID.  Secondary Diagnosis:	Essential hypertension  Instructions for follow-up, activity and diet:	continue home medications  Secondary Diagnosis:	Uncomplicated asthma, unspecified asthma severity  Instructions for follow-up, activity and diet:	continue home medications

## 2017-02-23 NOTE — DISCHARGE NOTE ADULT - SECONDARY DIAGNOSIS.
HIV (human immunodeficiency virus infection) Essential hypertension Uncomplicated asthma, unspecified asthma severity

## 2017-02-25 LAB
CULTURE RESULTS: SIGNIFICANT CHANGE UP
SPECIMEN SOURCE: SIGNIFICANT CHANGE UP

## 2017-02-27 LAB
CULTURE RESULTS: SIGNIFICANT CHANGE UP
CULTURE RESULTS: SIGNIFICANT CHANGE UP
SPECIMEN SOURCE: SIGNIFICANT CHANGE UP
SPECIMEN SOURCE: SIGNIFICANT CHANGE UP

## 2017-02-28 DIAGNOSIS — Z29.8 ENCOUNTER FOR OTHER SPECIFIED PROPHYLACTIC MEASURES: ICD-10-CM

## 2017-02-28 DIAGNOSIS — I10 ESSENTIAL (PRIMARY) HYPERTENSION: ICD-10-CM

## 2017-02-28 DIAGNOSIS — Z87.891 PERSONAL HISTORY OF NICOTINE DEPENDENCE: ICD-10-CM

## 2017-02-28 DIAGNOSIS — Z21 ASYMPTOMATIC HUMAN IMMUNODEFICIENCY VIRUS [HIV] INFECTION STATUS: ICD-10-CM

## 2017-02-28 DIAGNOSIS — J45.909 UNSPECIFIED ASTHMA, UNCOMPLICATED: ICD-10-CM

## 2017-02-28 DIAGNOSIS — L03.213 PERIORBITAL CELLULITIS: ICD-10-CM

## 2017-02-28 DIAGNOSIS — H10.9 UNSPECIFIED CONJUNCTIVITIS: ICD-10-CM

## 2020-02-11 NOTE — PATIENT PROFILE ADULT. - ABILITY TO HEAR (WITH HEARING AID OR HEARING APPLIANCE IF NORMALLY USED):
Adequate: hears normal conversation without difficulty What Is The Reason For Today's Visit?: Full Body Skin Examination with No Concerns What Is The Reason For Today's Visit? (Being Monitored For X): the re-examination of lesions previously examined

## 2022-10-26 NOTE — PATIENT PROFILE ADULT. - TEACHING/LEARNING FACTORS INFLUENCE READINESS TO LEARN
Patient comes to clinic for follow up anticoagulation visit.  Last INR on 10/12/22 was 1.5.  Dose maintained.   Today's INR is 1.6 and is below goal range.    Current warfarin total weekly dose of 13.5 mg verified.  Informed the INR result is below therapeutic range and instructed to increase current dose per protocol. Discussed dose and return date of 11/16/22 for next INR. See Anticoagulation flowsheet.    INR today is 1.6, remains below range and up from previous INR of 1.5, in two weeks on most stable dose of 15mg/wk. Pt admits missed warfarin on 10/22 after celebrating birthday and returning home late, forgot to take evening meds. Suspect missed dose r/t low INR today. However, due to two repeat low INRs, will increase dose slightly to 16.5mg/wk; INR recommended 2 weeks, patient declines, requests 3 weeks. Pt aware when to notify [facility] with changes.    Dr. Chavira is in the office today supervising the treatment.    Call your physician immediately if you notice any of the following symptoms of a blood clot:   · Sudden weakness in any limb  · Numbness or tingling anywhere  · Visual changes or loss of sight in either eye  · Sudden onset of slurred speech or inability to speak  · Dizziness or faintness  · New pain, swelling, redness or heat in any extremity  · New SOB or chest pain  Symptoms associated with blood clotting/low INR reviewed and verbalizes understanding.    Instructed to contact the clinic with any unusual bleeding or bruising, any changes in medications, diet, health status, lifestyle, or any other changes, questions or concerns. Verbalized understanding of all discussed.     
Hospitalized or in ED since last visit: No    Medication Changes: No    Upcoming/Recent Procedures: No    Referral Expiration Date Approaching: No    BP Readings from Last 1 Encounters:   10/14/22 138/76       
none

## 2022-12-06 NOTE — PROVIDER CONTACT NOTE (CRITICAL VALUE NOTIFICATION) - RECOMMENDATIONS
Pt comes to the ED via EMS. Has a plethora of issues. C/o severe right ear pain x 5 days.   Pt is homeless and wants to be admitted continue current antibiotics